# Patient Record
Sex: MALE | NOT HISPANIC OR LATINO | Employment: FULL TIME | URBAN - METROPOLITAN AREA
[De-identification: names, ages, dates, MRNs, and addresses within clinical notes are randomized per-mention and may not be internally consistent; named-entity substitution may affect disease eponyms.]

---

## 2019-11-13 ENCOUNTER — OFFICE VISIT (OUTPATIENT)
Dept: FAMILY MEDICINE CLINIC | Facility: CLINIC | Age: 34
End: 2019-11-13
Payer: COMMERCIAL

## 2019-11-13 VITALS
TEMPERATURE: 98.7 F | DIASTOLIC BLOOD PRESSURE: 76 MMHG | HEART RATE: 88 BPM | OXYGEN SATURATION: 98 % | BODY MASS INDEX: 22.85 KG/M2 | HEIGHT: 70 IN | SYSTOLIC BLOOD PRESSURE: 118 MMHG | WEIGHT: 159.6 LBS

## 2019-11-13 DIAGNOSIS — Z11.4 SCREENING FOR HIV (HUMAN IMMUNODEFICIENCY VIRUS): ICD-10-CM

## 2019-11-13 DIAGNOSIS — Z13.0 SCREENING FOR DEFICIENCY ANEMIA: ICD-10-CM

## 2019-11-13 DIAGNOSIS — R30.0 DYSURIA: ICD-10-CM

## 2019-11-13 DIAGNOSIS — Z13.29 SCREENING FOR THYROID DISORDER: ICD-10-CM

## 2019-11-13 DIAGNOSIS — Z13.6 SCREENING FOR CARDIOVASCULAR CONDITION: ICD-10-CM

## 2019-11-13 DIAGNOSIS — Z13.1 SCREENING FOR DIABETES MELLITUS: ICD-10-CM

## 2019-11-13 DIAGNOSIS — N20.0 RENAL STONE: Primary | ICD-10-CM

## 2019-11-13 LAB
SL AMB  POCT GLUCOSE, UA: NEGATIVE
SL AMB LEUKOCYTE ESTERASE,UA: NEGATIVE
SL AMB POCT BILIRUBIN,UA: NEGATIVE
SL AMB POCT BLOOD,UA: NEGATIVE
SL AMB POCT CLARITY,UA: CLEAR
SL AMB POCT COLOR,UA: YELLOW
SL AMB POCT KETONES,UA: NEGATIVE
SL AMB POCT NITRITE,UA: NEGATIVE
SL AMB POCT PH,UA: 7.5
SL AMB POCT SPECIFIC GRAVITY,UA: 1.02
SL AMB POCT URINE PROTEIN: NEGATIVE
SL AMB POCT UROBILINOGEN: 0.2

## 2019-11-13 PROCEDURE — 81003 URINALYSIS AUTO W/O SCOPE: CPT | Performed by: FAMILY MEDICINE

## 2019-11-13 PROCEDURE — 1036F TOBACCO NON-USER: CPT | Performed by: FAMILY MEDICINE

## 2019-11-13 PROCEDURE — 99203 OFFICE O/P NEW LOW 30 MIN: CPT | Performed by: FAMILY MEDICINE

## 2019-11-14 ENCOUNTER — TELEPHONE (OUTPATIENT)
Dept: FAMILY MEDICINE CLINIC | Facility: CLINIC | Age: 34
End: 2019-11-14

## 2019-11-14 NOTE — PROGRESS NOTES
[facility]      NAME: Myrtha Dance  AGE: 30 y o  SEX: male  : 1985   MRN: [id number]    DATE: 2019  TIME: 8:01 PM    Assessment and Plan     Problem List Items Addressed This Visit     None      Visit Diagnoses     Renal stone    -  Primary    Relevant Orders    CT abdomen pelvis wo contrast    Comprehensive metabolic panel    Dysuria        Relevant Orders    POCT urine dip auto non-scope (Completed)    Screening for cardiovascular condition        Relevant Orders    Lipid Panel with Direct LDL reflex    HIV 1/2 AG-AB combo    Screening for HIV (human immunodeficiency virus)        Screening for deficiency anemia        Relevant Orders    CBC and differential    Screening for diabetes mellitus        Screening for thyroid disorder        Relevant Orders    TSH, 3rd generation        Patient with probable renal lithiasis  He presented with stones that he passed  Very small/gravel size calcifications  Will check CT stone study  Check baseline blood work  Possible Urology referral pending results  Return to office in:  P r n  Chief Complaint     Chief Complaint   Patient presents with   [address]     Pt is new to our office here to establish care  Pt c/o frequent urination  History of Present Illness     Patient presents to establish care  He does have a concern  He has been having urinary symptoms for close to 2 months  He was seen in urgent care approximately 1 month ago and had a urine culture which was negative  He has continued to have some urinary hesitancy with alternating urgency and a sense of incomplete emptying  He did past what he believed to be small stones very recently  He has a strong family history of kidney stones        The following portions of the patient's history were reviewed and updated as appropriate: allergies, current medications, past family history, past medical history, past social history, past surgical history and problem list     Review of Systems   Review of Systems   Constitutional: Negative  Respiratory: Negative  Cardiovascular: Negative  Gastrointestinal: Negative  Genitourinary: Positive for difficulty urinating, dysuria, frequency and urgency  Musculoskeletal: Negative  Psychiatric/Behavioral: Negative  Active Problem List   There is no problem list on file for this patient  Objective   /76 (BP Location: Left arm, Patient Position: Sitting, Cuff Size: Adult)   Pulse 88   Temp 98 7 °F (37 1 °C) (Tympanic)   Ht 5' 10 32" (1 786 m)   Wt 72 4 kg (159 lb 9 6 oz)   SpO2 98%   BMI 22 70 kg/m²     Physical Exam   Constitutional: He is oriented to person, place, and time  He appears well-developed and well-nourished  No distress  HENT:   Head: Normocephalic and atraumatic  Eyes: Pupils are equal, round, and reactive to light  Conjunctivae are normal  Right eye exhibits no discharge  Neck: Normal range of motion  No thyromegaly present  Cardiovascular: Normal rate and regular rhythm  Pulmonary/Chest: Effort normal and breath sounds normal  No respiratory distress  Abdominal: Soft  Bowel sounds are normal  He exhibits no distension  There is no tenderness  Lymphadenopathy:     He has no cervical adenopathy  Neurological: He is alert and oriented to person, place, and time  Skin: Skin is warm and dry  He is not diaphoretic  Psychiatric: He has a normal mood and affect  His behavior is normal  Judgment and thought content normal    Nursing note and vitals reviewed  Current Medications   No current outpatient medications on file      Health Maintenance     Health Maintenance   Topic Date Due    HIV Screening  05/10/2002    DTaP,Tdap,and Td Vaccines (1 - Tdap) 05/10/2008    INFLUENZA VACCINE  12/10/2019 (Originally 7/1/2019)    Depression Screening PHQ  11/13/2020    BMI: Adult  11/13/2020    Pneumococcal Vaccine: 65+ Years (1 of 2 - PCV13) 05/10/2052    Pneumococcal Vaccine: Pediatrics (0 to 5 Years) and At-Risk Patients (6 to 59 Years)  Aged Out    HEPATITIS B VACCINES  Aged Out       There is no immunization history on file for this patient      Laisha Curiel DO  [facility]

## 2019-11-16 ENCOUNTER — APPOINTMENT (OUTPATIENT)
Dept: LAB | Facility: CLINIC | Age: 34
End: 2019-11-16
Payer: COMMERCIAL

## 2019-11-16 DIAGNOSIS — Z13.0 SCREENING FOR DEFICIENCY ANEMIA: ICD-10-CM

## 2019-11-16 DIAGNOSIS — N20.0 RENAL STONE: ICD-10-CM

## 2019-11-16 DIAGNOSIS — Z13.29 SCREENING FOR THYROID DISORDER: ICD-10-CM

## 2019-11-16 DIAGNOSIS — Z13.6 SCREENING FOR CARDIOVASCULAR CONDITION: ICD-10-CM

## 2019-11-16 LAB
ALBUMIN SERPL BCP-MCNC: 4.4 G/DL (ref 3.5–5)
ALP SERPL-CCNC: 62 U/L (ref 46–116)
ALT SERPL W P-5'-P-CCNC: 40 U/L (ref 12–78)
ANION GAP SERPL CALCULATED.3IONS-SCNC: 6 MMOL/L (ref 4–13)
AST SERPL W P-5'-P-CCNC: 13 U/L (ref 5–45)
BASOPHILS # BLD AUTO: 0.06 THOUSANDS/ΜL (ref 0–0.1)
BASOPHILS NFR BLD AUTO: 1 % (ref 0–1)
BILIRUB SERPL-MCNC: 0.93 MG/DL (ref 0.2–1)
BUN SERPL-MCNC: 15 MG/DL (ref 5–25)
CALCIUM SERPL-MCNC: 9.3 MG/DL (ref 8.3–10.1)
CHLORIDE SERPL-SCNC: 106 MMOL/L (ref 100–108)
CHOLEST SERPL-MCNC: 146 MG/DL (ref 50–200)
CO2 SERPL-SCNC: 28 MMOL/L (ref 21–32)
CREAT SERPL-MCNC: 0.78 MG/DL (ref 0.6–1.3)
EOSINOPHIL # BLD AUTO: 0.07 THOUSAND/ΜL (ref 0–0.61)
EOSINOPHIL NFR BLD AUTO: 1 % (ref 0–6)
ERYTHROCYTE [DISTWIDTH] IN BLOOD BY AUTOMATED COUNT: 12.5 % (ref 11.6–15.1)
GFR SERPL CREATININE-BSD FRML MDRD: 119 ML/MIN/1.73SQ M
GLUCOSE P FAST SERPL-MCNC: 85 MG/DL (ref 65–99)
HCT VFR BLD AUTO: 48.1 % (ref 36.5–49.3)
HDLC SERPL-MCNC: 37 MG/DL
HGB BLD-MCNC: 15.4 G/DL (ref 12–17)
IMM GRANULOCYTES # BLD AUTO: 0.02 THOUSAND/UL (ref 0–0.2)
IMM GRANULOCYTES NFR BLD AUTO: 0 % (ref 0–2)
LDLC SERPL CALC-MCNC: 96 MG/DL (ref 0–100)
LYMPHOCYTES # BLD AUTO: 2.51 THOUSANDS/ΜL (ref 0.6–4.47)
LYMPHOCYTES NFR BLD AUTO: 34 % (ref 14–44)
MCH RBC QN AUTO: 28.7 PG (ref 26.8–34.3)
MCHC RBC AUTO-ENTMCNC: 32 G/DL (ref 31.4–37.4)
MCV RBC AUTO: 90 FL (ref 82–98)
MONOCYTES # BLD AUTO: 0.6 THOUSAND/ΜL (ref 0.17–1.22)
MONOCYTES NFR BLD AUTO: 8 % (ref 4–12)
NEUTROPHILS # BLD AUTO: 4.22 THOUSANDS/ΜL (ref 1.85–7.62)
NEUTS SEG NFR BLD AUTO: 56 % (ref 43–75)
NRBC BLD AUTO-RTO: 0 /100 WBCS
PLATELET # BLD AUTO: 210 THOUSANDS/UL (ref 149–390)
PMV BLD AUTO: 12.5 FL (ref 8.9–12.7)
POTASSIUM SERPL-SCNC: 3.9 MMOL/L (ref 3.5–5.3)
PROT SERPL-MCNC: 7.4 G/DL (ref 6.4–8.2)
RBC # BLD AUTO: 5.36 MILLION/UL (ref 3.88–5.62)
SODIUM SERPL-SCNC: 140 MMOL/L (ref 136–145)
TRIGL SERPL-MCNC: 63 MG/DL
TSH SERPL DL<=0.05 MIU/L-ACNC: 1.66 UIU/ML (ref 0.36–3.74)
WBC # BLD AUTO: 7.48 THOUSAND/UL (ref 4.31–10.16)

## 2019-11-16 PROCEDURE — 80061 LIPID PANEL: CPT

## 2019-11-16 PROCEDURE — 36415 COLL VENOUS BLD VENIPUNCTURE: CPT

## 2019-11-16 PROCEDURE — 85025 COMPLETE CBC W/AUTO DIFF WBC: CPT

## 2019-11-16 PROCEDURE — 87389 HIV-1 AG W/HIV-1&-2 AB AG IA: CPT

## 2019-11-16 PROCEDURE — 80053 COMPREHEN METABOLIC PANEL: CPT

## 2019-11-16 PROCEDURE — 84443 ASSAY THYROID STIM HORMONE: CPT

## 2019-11-18 LAB — HIV 1+2 AB+HIV1 P24 AG SERPL QL IA: NORMAL

## 2019-11-20 ENCOUNTER — HOSPITAL ENCOUNTER (OUTPATIENT)
Dept: CT IMAGING | Facility: HOSPITAL | Age: 34
Discharge: HOME | End: 2019-11-20
Payer: COMMERCIAL

## 2019-11-20 DIAGNOSIS — N20.0 RENAL STONE: ICD-10-CM

## 2019-11-20 PROCEDURE — 74176 CT ABD & PELVIS W/O CONTRAST: CPT

## 2020-02-24 ENCOUNTER — OFFICE VISIT (OUTPATIENT)
Dept: FAMILY MEDICINE CLINIC | Facility: CLINIC | Age: 35
End: 2020-02-24
Payer: COMMERCIAL

## 2020-02-24 VITALS
OXYGEN SATURATION: 99 % | WEIGHT: 156.6 LBS | HEIGHT: 71 IN | DIASTOLIC BLOOD PRESSURE: 80 MMHG | RESPIRATION RATE: 16 BRPM | SYSTOLIC BLOOD PRESSURE: 112 MMHG | HEART RATE: 97 BPM | TEMPERATURE: 97.8 F | BODY MASS INDEX: 21.92 KG/M2

## 2020-02-24 DIAGNOSIS — N41.0 PROSTATITIS, ACUTE: Primary | ICD-10-CM

## 2020-02-24 PROCEDURE — 1036F TOBACCO NON-USER: CPT | Performed by: FAMILY MEDICINE

## 2020-02-24 PROCEDURE — 99213 OFFICE O/P EST LOW 20 MIN: CPT | Performed by: FAMILY MEDICINE

## 2020-02-24 PROCEDURE — 3008F BODY MASS INDEX DOCD: CPT | Performed by: FAMILY MEDICINE

## 2020-02-24 PROCEDURE — 3008F BODY MASS INDEX DOCD: CPT | Performed by: PHYSICIAN ASSISTANT

## 2020-02-24 RX ORDER — CIPROFLOXACIN 500 MG/1
500 TABLET, FILM COATED ORAL EVERY 12 HOURS SCHEDULED
Qty: 28 TABLET | Refills: 0 | Status: SHIPPED | OUTPATIENT
Start: 2020-02-24 | End: 2020-03-09

## 2020-07-15 ENCOUNTER — TELEMEDICINE (OUTPATIENT)
Dept: FAMILY MEDICINE CLINIC | Facility: CLINIC | Age: 35
End: 2020-07-15
Payer: COMMERCIAL

## 2020-07-15 DIAGNOSIS — Z20.828 EXPOSURE TO SARS-ASSOCIATED CORONAVIRUS: Primary | ICD-10-CM

## 2020-07-15 PROCEDURE — 99213 OFFICE O/P EST LOW 20 MIN: CPT | Performed by: PHYSICIAN ASSISTANT

## 2020-07-15 NOTE — LETTER
July 15, 2020     Patient: Alanna Rodriguez   YOB: 1985   Date of Visit: 7/15/2020       To Whom it May Concern:    Alanna Rodriguez is under my professional care  He was seen in my office on 7/15/2020  Recommend quarantine and self isolation if possible for 14 days  If you have any questions or concerns, please don't hesitate to call           Sincerely,          Mukesh Nevarez PA-C        CC: No Recipients

## 2020-07-15 NOTE — PROGRESS NOTES
COVID-19 Virtual Visit     Assessment/Plan:    Problem List Items Addressed This Visit     None      Visit Diagnoses     Exposure to SARS-associated coronavirus    -  Primary        This virtual check-in was done via [employer] and patient was informed that this is a secure, HIPAA-compliant platform  He agrees to proceed  Disposition:      I recommended self-quarantine for 14 days and to call back for worsening symptoms or development of shortness of breath  I spent 15 minutes directly with the patient during this visit    Encounter provider Mukesh Nevarez PA-C    Provider located at [address] [facility] [address] [facility] [address]  [phone]    Recent Visits  No visits were found meeting these conditions  Showing recent visits within past 7 days and meeting all other requirements     Today's Visits  Date Type Provider Dept   [facility] [address] [facility] today's visits and meeting all other requirements     Future Appointments  No visits were found meeting these conditions  Showing future appointments within next 150 days and meeting all other requirements        Patient agrees to participate in a virtual check in via telephone or video visit instead of presenting to the office to address urgent/immediate medical needs  Patient is aware this is a billable service  After connecting through [email or web address], the patient was identified by name and date of birth  Alanna Rodriguez was informed that this was a telemedicine visit and that the exam was being conducted confidentially over secure lines  My office door was closed  No one else was in the room  Alanna Rodriguez acknowledged consent and understanding of privacy and security of the telemedicine visit  I informed the patient that I have reviewed his record in Epic and presented the opportunity for him to ask any questions regarding the visit today   The patient agreed to participate  Subjective  Gabriela Snider is a 37 y o  male who is concerned about COVID-19  He reports no symptoms  He has not experienced no symptoms He has had contact with a person who is under investigation for or who is positive for COVID-19 within the last 14 days  He has not been hospitalized recently for fever and/or lower respiratory symptoms  History reviewed  No pertinent past medical history  Past Surgical History:   Procedure Laterality Date    WISDOM TOOTH EXTRACTION         No current outpatient medications on file  No current facility-administered medications for this visit  No Known Allergies    Review of Systems   Constitutional: Negative for chills, fatigue and fever  HENT: Negative for congestion, ear pain, postnasal drip, rhinorrhea and sore throat  Respiratory: Negative for cough, chest tightness, shortness of breath and wheezing  Gastrointestinal: Negative for abdominal pain, diarrhea, nausea and vomiting  Musculoskeletal: Negative for arthralgias and myalgias  Skin: Negative for rash  Neurological: Negative for dizziness, light-headedness and headaches  Hematological: Negative for adenopathy  Video Exam    There were no vitals filed for this visit  Jackie Mccullough appears healthy, alert, no distress, cooperative, smiling  Physical Exam   Constitutional: He is oriented to person, place, and time  He appears well-developed and well-nourished  No distress  HENT:   Head: Normocephalic and atraumatic  Eyes: EOM are normal    Neck: Normal range of motion  Pulmonary/Chest: Effort normal  No respiratory distress  Musculoskeletal: Normal range of motion  Neurological: He is alert and oriented to person, place, and time  Psychiatric: He has a normal mood and affect  His behavior is normal  Judgment and thought content normal         VIRTUAL VISIT DISCLAIMER    Gabriela Snider acknowledges that he has consented to an online visit or consultation  He understands that the online visit is based solely on information provided by him, and that, in the absence of a face-to-face physical evaluation by the physician, the diagnosis he receives is both limited and provisional in terms of accuracy and completeness  This is not intended to replace a full medical face-to-face evaluation by the physician  Shanika Vicente understands and accepts these terms

## 2021-07-19 ENCOUNTER — TELEPHONE (OUTPATIENT)
Dept: FAMILY MEDICINE CLINIC | Facility: CLINIC | Age: 36
End: 2021-07-19

## 2021-07-21 ENCOUNTER — TELEMEDICINE (OUTPATIENT)
Dept: FAMILY MEDICINE CLINIC | Facility: CLINIC | Age: 36
End: 2021-07-21
Payer: COMMERCIAL

## 2021-07-21 DIAGNOSIS — Z20.822 EXPOSURE TO COVID-19 VIRUS: Primary | ICD-10-CM

## 2021-07-21 PROCEDURE — 1036F TOBACCO NON-USER: CPT | Performed by: FAMILY MEDICINE

## 2021-07-21 PROCEDURE — 99213 OFFICE O/P EST LOW 20 MIN: CPT | Performed by: FAMILY MEDICINE

## 2021-07-21 NOTE — ASSESSMENT & PLAN NOTE
Patient has been exposed to covid-19 on 7/11/21  Patient should adhere to the following recommendations:   · Stay home except for medical care and separate yourself from other people and animals in your home for 10 days after last day of exposure  · Cover your coughs and sneezes with a tissue   · Wash your hand frequently   · Avoid sharing personal household items   · Clean and disinfect frequently touched surfaces   · Monitor your symptoms  If you have a fever, cough, sore throat, or shortness of breath contact your Primary care physician  · If you have difficulty breathing seek prompt medical attention  If you need to call 911, notify the dispatch personnel that you are being evaluated for COVID-19   Patient may end quarantine after 10 days as long they are asymptomatic  He is cleared to end quarantine on 7/22/21  Patient sent letter to return to work  Patient verbalized understanding

## 2021-07-21 NOTE — LETTER
[address]  [facility] [address]  Phone#  [phone]  Fax#  [phone]      July 21, 2021     Patient: Koren Osgood  YOB: 1985  Date of Last Encounter: 7/21/2021    To whom it may concern:    Koren Osgood was in contact with a COVID-19-positive patient on 7/11/2021 and requires a 10-day quarantine according to current CDC guidelines  He is asymptomatic at this time  He is medically cleared to return to work on 7/22/2021 without any restrictions       Sincerely,       Dr Yan Da Silva, DO

## 2021-07-21 NOTE — PROGRESS NOTES
COVID-19 Outpatient Progress Note    Assessment/Plan:    Problem List Items Addressed This Visit        Other    Exposure to COVID-19 virus - Primary     Patient has been exposed to covid-19 on 7/11/21  Patient should adhere to the following recommendations:   · Stay home except for medical care and separate yourself from other people and animals in your home for 10 days after last day of exposure  · Cover your coughs and sneezes with a tissue   · Wash your hand frequently   · Avoid sharing personal household items   · Clean and disinfect frequently touched surfaces   · Monitor your symptoms  If you have a fever, cough, sore throat, or shortness of breath contact your Primary care physician  · If you have difficulty breathing seek prompt medical attention  If you need to call 911, notify the dispatch personnel that you are being evaluated for COVID-19   Patient may end quarantine after 10 days as long they are asymptomatic  He is cleared to end quarantine on 7/22/21  Patient sent letter to return to work  Patient verbalized understanding  Disposition:     After clarifying the patient's history, my suspicion for COVID-19 infection is very low  I have spent 10 minutes directly with the patient  Greater than 50% of this time was spent in counseling/coordination of care regarding: impressions          Verification of patient location:    Patient is currently located in the state of [place]  Patient is currently located in a state in which I am licensed    Encounter provider Heather Byers DO    Provider located at [address] [facility] [address] [place] [id number] [phone]    Recent Visits  Date Type Provider Dept   07/19/21 Telephone Ham Guerrero 12 recent visits within past 7 days and meeting all other requirements  Today's Visits  Date Type Provider Dept   07/21/21 9 [address] Haro, [address] [facility] [employer]   Showing today's visits and meeting all other requirements  Future Appointments  No visits were found meeting these conditions  Showing future appointments within next 150 days and meeting all other requirements     This virtual check-in was done via [employer] and patient was informed that this is a secure, HIPAA-compliant platform  He agrees to proceed  Patient agrees to participate in a virtual check in via telephone or video visit instead of presenting to the office to address urgent/immediate medical needs  Patient is aware this is a billable service  After connecting through [facility], the patient was identified by name and date of birth  Grover Blood was informed that this was a telemedicine visit and that the exam was being conducted confidentially over secure lines  My office door was closed  No one else was in the room  Grover Blood acknowledged consent and understanding of privacy and security of the telemedicine visit  I informed the patient that I have reviewed his record in Epic and presented the opportunity for him to ask any questions regarding the visit today  The patient agreed to participate  Subjective: Grover Blood is a 31 y o  male who is concerned about COVID-19  Patient is currently asymptomatic  Patient denies fever, chills, fatigue, malaise, congestion, rhinorrhea, sore throat, anosmia, loss of taste, cough, shortness of breath, chest tightness, abdominal pain, nausea, vomiting, diarrhea, myalgias and headaches       Date of exposure: 7/11/2021    Exposure:   Contact with a person who is under investigation (PUI) for or who is positive for COVID-19 within the last 14 days?: Yes    Hospitalized recently for fever and/or lower respiratory symptoms?: No      Currently a healthcare worker that is involved in direct patient care?: No      Works in a special setting where the risk of COVID-19 transmission may be high? (this may include long-term care, correctional and retirement facilities; homeless shelters; assisted-living facilities and group homes ): No      Resident in a special setting where the risk of COVID-19 transmission may be high? (this may include long-term care, correctional and skilled nursing facilities; homeless shelters; assisted-living facilities and group homes ): No      Patient presenting after exposure to COVID-19 on 7/11/2021  He was with a friend who tested positive a few days after they were together  At the time of exposure the friend was asymptomatic  Patient has been in quarantine for the last 10 days  He has not gone to work (works at [employer])  Patient is not vaccinated against COVID-19 and wears a mask at work  The patient is feeling well and asymptomatic  He is requesting a letter for clearance to return to work  Lab Results   Component Value Date    SARSCORONAVI Not Detected 11/18/2020     History reviewed  No pertinent past medical history  Past Surgical History:   Procedure Laterality Date    WISDOM TOOTH EXTRACTION       No current outpatient medications on file  No current facility-administered medications for this visit  No Known Allergies    Review of Systems   Constitutional: Negative for chills, fatigue and fever  HENT: Negative for congestion, rhinorrhea and sore throat  Respiratory: Negative for cough, chest tightness and shortness of breath  Gastrointestinal: Negative for abdominal pain, diarrhea, nausea and vomiting  Musculoskeletal: Negative for myalgias  Neurological: Negative for headaches  All other systems reviewed and are negative  Objective: There were no vitals filed for this visit  Physical Exam  Vitals and nursing note reviewed  Constitutional:       General: He is not in acute distress  Appearance: Normal appearance  He is not ill-appearing  HENT:      Head: Normocephalic and atraumatic  Nose: No congestion or rhinorrhea     Eyes:      Extraocular Movements: Extraocular movements intact  Pulmonary:      Effort: Pulmonary effort is normal  No respiratory distress  Neurological:      General: No focal deficit present  Mental Status: He is alert and oriented to person, place, and time  Psychiatric:         Mood and Affect: Mood normal          Behavior: Behavior normal          Thought Content: Thought content normal          VIRTUAL VISIT DISCLAIMER    Shayna Lin verbally agrees to participate in [employer]  Pt is aware that [employer] could be limited without vital signs or the ability to perform a full hands-on physical exam  Donny Gamez understands he or the provider may request at any time to terminate the video visit and request the patient to seek care or treatment in person